# Patient Record
Sex: FEMALE | Race: WHITE | Employment: OTHER | URBAN - METROPOLITAN AREA
[De-identification: names, ages, dates, MRNs, and addresses within clinical notes are randomized per-mention and may not be internally consistent; named-entity substitution may affect disease eponyms.]

---

## 2024-03-25 DIAGNOSIS — I89.0 LYMPHEDEMA: Primary | ICD-10-CM

## 2024-06-11 DIAGNOSIS — I89.0 LYMPHEDEMA: Primary | ICD-10-CM

## 2025-01-30 ENCOUNTER — APPOINTMENT (OUTPATIENT)
Dept: ULTRASOUND IMAGING | Facility: HOSPITAL | Age: 80
End: 2025-01-30
Attending: EMERGENCY MEDICINE
Payer: MEDICARE

## 2025-01-30 ENCOUNTER — HOSPITAL ENCOUNTER (EMERGENCY)
Facility: HOSPITAL | Age: 80
Discharge: HOME | End: 2025-01-30
Attending: EMERGENCY MEDICINE
Payer: MEDICARE

## 2025-01-30 VITALS
OXYGEN SATURATION: 97 % | DIASTOLIC BLOOD PRESSURE: 47 MMHG | SYSTOLIC BLOOD PRESSURE: 170 MMHG | RESPIRATION RATE: 20 BRPM | HEIGHT: 64 IN | TEMPERATURE: 97 F | BODY MASS INDEX: 36.19 KG/M2 | HEART RATE: 71 BPM | WEIGHT: 212 LBS

## 2025-01-30 DIAGNOSIS — M25.562 ACUTE PAIN OF LEFT KNEE: Primary | ICD-10-CM

## 2025-01-30 PROCEDURE — 99284 EMERGENCY DEPT VISIT MOD MDM: CPT

## 2025-01-30 PROCEDURE — 93971 EXTREMITY STUDY: CPT | Performed by: EMERGENCY MEDICINE

## 2025-01-30 RX ORDER — HYDROCODONE BITARTRATE AND ACETAMINOPHEN 5; 325 MG/1; MG/1
1 TABLET ORAL EVERY 6 HOURS PRN
Qty: 10 TABLET | Refills: 0 | Status: SHIPPED | OUTPATIENT
Start: 2025-01-30 | End: 2025-02-06

## 2025-01-30 NOTE — ED INITIAL ASSESSMENT (HPI)
Pt c/o atraumatic pain just distal to L kneecap for \"a while\" but recently more uncomfortable. Also reports LLE \"giving out\" while walking, no falls d/t using walker. BLE edema but reported worse to LLE

## 2025-01-30 NOTE — ED PROVIDER NOTES
Patient Seen in: [facility] Emergency Department      History     Chief Complaint   Patient presents with    Leg Pain     Stated Complaint: leg pain    Subjective:   HPI      79-year-old female presents for evaluation of left knee pain.  Patient reports intermittent knee pain over the past several months, worsening over the last month.  Describes pain in her left knee, distal to the left knee and radiating laterally.  Minimal relief with Advil at home.  No recent injuries, not on anticoagulation.  Denies fever.    Objective:     No pertinent past medical history.            No pertinent past surgical history.              No pertinent social history.                Physical Exam     ED Triage Vitals [01/30/25 1547]   BP (!) 191/79   Pulse 70   Resp 20   Temp 97.3 °F (36.3 °C)   Temp src    SpO2 95 %   O2 Device None (Room air)       Current Vitals:   Vital Signs  BP: (!) 170/47  Pulse: 71  Resp: 20  Temp: 97.3 °F (36.3 °C)  MAP (mmHg): (!) 113    Oxygen Therapy  SpO2: 97 %  O2 Device: None (Room air)        Physical Exam  Vitals and nursing note reviewed.   Constitutional:       General: She is not in acute distress.     Appearance: Normal appearance. She is not ill-appearing or toxic-appearing.   HENT:      Head: Normocephalic and atraumatic.   Eyes:      Conjunctiva/sclera: Conjunctivae normal.   Cardiovascular:      Rate and Rhythm: Normal rate.   Pulmonary:      Effort: Pulmonary effort is normal. No respiratory distress.   Musculoskeletal:         General: Normal range of motion.      Cervical back: Normal range of motion. No rigidity.      Right lower leg: Edema present.      Left lower leg: Edema present.   Skin:     Comments: Skin changes consistent with chronic edema noted to bilateral lower extremities   Neurological:      General: No focal deficit present.      Mental Status: She is alert.   Psychiatric:         Mood and Affect: Mood normal.             ED Course   Labs Reviewed - No data to  display         US VENOUS DOPPLER LEG LEFT - DIAG IMG (CPT=93971)    Result Date: 1/30/2025  CONCLUSION: No evidence of left lower extremity DVT.   Dictated by (CST): Bob Love MD on 1/30/2025 at 5:48 PM     Finalized by (CST): Bob Love MD on 1/30/2025 at 5:49 PM                [place]              Medical Decision Making  Differential diagnosis includes but is not limited to osteoarthritis, gout, DVT    Well-appearing patient, with acute on chronic knee pain no evidence of DVT on ultrasound.  Discussed findings with patient and son at the bedside, advised close outpatient follow-up with orthopedic surgery.      Problems Addressed:  Acute pain of left knee: chronic illness or injury with exacerbation, progression, or side effects of treatment    Amount and/or Complexity of Data Reviewed  Radiology: ordered.    Risk  Prescription drug management.        Disposition and Plan     Clinical Impression:  1. Acute pain of left knee         Disposition:  Discharge  1/30/2025  5:57 pm    Follow-up:  Nate Fox MD  [address] [address]  [phone]    Follow up      We recommend that you schedule follow up care with a primary care provider within the next three months to obtain basic health screening including reassessment of your blood pressure.      Medications Prescribed:  Discharge Medication List as of 1/30/2025  6:18 PM        START taking these medications    Details   HYDROcodone-acetaminophen 5-325 MG Oral Tab Take 1 tablet by mouth every 6 (six) hours as needed for Pain (Do not exceed 8 tabs per day.)., Normal, Disp-10 tablet, R-0                 Supplementary Documentation:

## 2025-01-30 NOTE — DISCHARGE INSTRUCTIONS
Take Tylenol as needed for pain.  If your pain is not relieved with Tylenol  you can take Norco as needed for severe pain.  Each tablet of Norco has 325 milligrams acetaminophen (Tylenol).  Do not take more than 3900 mg acetaminophen (Tylenol) in 1 day.  Do not drink alcohol or drive while taking Norco.  Take an over-the-counter stool softener such as Colace while taking Norco.  Risk of addiction to pain medication increases after 3 days, make sure to use this for shortest duration as possible and only for severe pain.     You can take ibuprofen as needed for pain as well.    Ice your knee for 15 to 20 minutes several times throughout the day.    See orthopedic surgery for follow-up.    Return to the ER if you develop any emergent concerns.